# Patient Record
Sex: MALE | Race: WHITE | NOT HISPANIC OR LATINO | Employment: OTHER | URBAN - METROPOLITAN AREA
[De-identification: names, ages, dates, MRNs, and addresses within clinical notes are randomized per-mention and may not be internally consistent; named-entity substitution may affect disease eponyms.]

---

## 2023-02-03 ENCOUNTER — OFFICE VISIT (OUTPATIENT)
Dept: ORTHOPEDICS | Facility: CLINIC | Age: 73
End: 2023-02-03
Payer: MEDICARE

## 2023-02-03 ENCOUNTER — HOSPITAL ENCOUNTER (OUTPATIENT)
Dept: RADIOLOGY | Facility: HOSPITAL | Age: 73
Discharge: HOME | End: 2023-02-03
Attending: PHYSICIAN ASSISTANT
Payer: MEDICARE

## 2023-02-03 DIAGNOSIS — M18.12 PRIMARY OSTEOARTHRITIS OF FIRST CARPOMETACARPAL JOINT OF LEFT HAND: ICD-10-CM

## 2023-02-03 DIAGNOSIS — M79.642 BILATERAL HAND PAIN: ICD-10-CM

## 2023-02-03 DIAGNOSIS — M79.642 BILATERAL HAND PAIN: Primary | ICD-10-CM

## 2023-02-03 DIAGNOSIS — M18.11 PRIMARY OSTEOARTHRITIS OF FIRST CARPOMETACARPAL JOINT OF RIGHT HAND: ICD-10-CM

## 2023-02-03 DIAGNOSIS — M79.641 BILATERAL HAND PAIN: ICD-10-CM

## 2023-02-03 DIAGNOSIS — M79.641 BILATERAL HAND PAIN: Primary | ICD-10-CM

## 2023-02-03 DIAGNOSIS — M19.042 PRIMARY OSTEOARTHRITIS OF BOTH HANDS: Primary | ICD-10-CM

## 2023-02-03 DIAGNOSIS — M19.041 PRIMARY OSTEOARTHRITIS OF BOTH HANDS: Primary | ICD-10-CM

## 2023-02-03 PROCEDURE — 73130 XR HAND COMPLETE 3 VIEW LEFT: ICD-10-PCS | Mod: 26,LT,, | Performed by: RADIOLOGY

## 2023-02-03 PROCEDURE — 99204 PR OFFICE/OUTPT VISIT, NEW, LEVL IV, 45-59 MIN: ICD-10-PCS | Mod: S$PBB,,, | Performed by: PHYSICIAN ASSISTANT

## 2023-02-03 PROCEDURE — 73130 X-RAY EXAM OF HAND: CPT | Mod: TC,PO,LT

## 2023-02-03 PROCEDURE — 73130 X-RAY EXAM OF HAND: CPT | Mod: 26,LT,, | Performed by: RADIOLOGY

## 2023-02-03 PROCEDURE — 99204 OFFICE O/P NEW MOD 45 MIN: CPT | Mod: PBBFAC,PN | Performed by: PHYSICIAN ASSISTANT

## 2023-02-03 PROCEDURE — 73130 X-RAY EXAM OF HAND: CPT | Mod: 26,RT,, | Performed by: RADIOLOGY

## 2023-02-03 PROCEDURE — 73130 X-RAY EXAM OF HAND: CPT | Mod: TC,PO,RT

## 2023-02-03 PROCEDURE — 99999 PR PBB SHADOW E&M-NEW PATIENT-LVL IV: ICD-10-PCS | Mod: PBBFAC,,, | Performed by: PHYSICIAN ASSISTANT

## 2023-02-03 PROCEDURE — 99204 OFFICE O/P NEW MOD 45 MIN: CPT | Mod: S$PBB,,, | Performed by: PHYSICIAN ASSISTANT

## 2023-02-03 PROCEDURE — 99999 PR PBB SHADOW E&M-NEW PATIENT-LVL IV: CPT | Mod: PBBFAC,,, | Performed by: PHYSICIAN ASSISTANT

## 2023-02-03 NOTE — PROGRESS NOTES
2/3/2023    Chief Complaint:  Chief Complaint   Patient presents with    Left Hand - Pain    Right Hand - Pain    Arthritis       HPI:  Conrado Queen is a 72 y.o. male, who presents to clinic today for evaluation of his bilateral hand pain.  States he has had mild bilateral hand pain for years.  States recently over these past 3 weeks has gotten worse.  Denies any acute injury that he can recall.  Denies any paresthesias.  States pain on average is 5/10.  States pain is worse with activity.  States pain is better with rest.  States he is unable to take NSAIDs due to his kidneys.  Denies any other complaints at this time.    PMHX:  Past Medical History:   Diagnosis Date    Cardiomyopathy     Chronic systolic heart failure 2014    Coronary artery disease     Costochondritis 2015    Diabetes mellitus     Diabetes mellitus with renal complications 2014    Diabetes mellitus, type 2     History of internal cardiac defibrillator 2014    History of stroke 2014    HTN (hypertension) 2014    Hypertension     S/P CABG (coronary artery bypass graft) 2014    Seizure disorder 2014    Stroke     Syncope and collapse        PSHX:  Past Surgical History:   Procedure Laterality Date    CARDIAC CATHETERIZATION      CORONARY ANGIOPLASTY      CORONARY ARTERY BYPASS GRAFT  2014    INSERT / REPLACE / REMOVE PACEMAKER      ICD Medtronic    SHOULDER SURGERY Left     TOTAL KNEE ARTHROPLASTY Bilateral     TRANSURETHRAL RESECTION OF PROSTATE         FMHX:  Family History   Problem Relation Age of Onset    Stroke Mother     Heart disease Father     Stroke Father     Diabetes type II Brother        SOCHX:  Social History     Tobacco Use    Smoking status: Former     Packs/day: 0.50     Years: 25.00     Pack years: 12.50     Types: Cigarettes     Quit date: 1988     Years since quittin.5    Smokeless tobacco: Not on file   Substance Use Topics    Alcohol use: Yes     Comment:  occasional       ALLERGIES:  Dilantin  [phenytoin sodium extended]    CURRENT MEDICATIONS:  Current Outpatient Medications on File Prior to Visit   Medication Sig Dispense Refill    aspirin (ECOTRIN) 81 MG EC tablet Take 81 mg by mouth.      carbamazepine (TEGRETOL XR) 100 MG 12 hr tablet Take 100 mg by mouth.      carvedilol (COREG) 3.125 MG tablet Take 3.125 mg by mouth.      clobetasol (TEMOVATE) 0.05 % cream Apply topically.      diclofenac (VOLTAREN) 75 MG EC tablet Take 1 tablet (75 mg total) by mouth 2 (two) times daily. 60 tablet 0    ezetimibe (ZETIA) 10 mg tablet       fluticasone (FLONASE) 50 mcg/actuation nasal spray       furosemide (LASIX) 40 MG tablet Take 40 mg by mouth.      gabapentin (NEURONTIN) 300 MG capsule Take 600 mg by mouth.      insulin aspart U-100 (NOVOLOG) 100 unit/mL (3 mL) InPn pen 15 Units.      insulin glargine (LANTUS) 100 unit/mL injection Inject 30 Units into the skin.      liraglutide 0.6 mg/0.1 mL, 18 mg/3 mL, subq PNIJ (VICTOZA 2-MARSHALL) 0.6 mg/0.1 mL (18 mg/3 mL) PnIj pen Inject 1.2 mg into the skin.      meclizine (ANTIVERT) 25 mg tablet       meloxicam (MOBIC) 7.5 MG tablet       omega-3 acid ethyl esters (LOVAZA) 1 gram capsule Take 2 g by mouth.      omeprazole (PRILOSEC) 20 MG capsule Take 20 mg by mouth.      oxycodone-acetaminophen (PERCOCET)  mg per tablet       ramipril (ALTACE) 10 MG capsule Take 10 mg by mouth.      rosuvastatin (CRESTOR) 40 MG Tab Take 40 mg by mouth.      selenium sulfide (SELSUN) 2.5 % Susp       silodosin (RAPAFLO) 8 mg Cap capsule Take 1 capsule by mouth.      spironolactone (ALDACTONE) 25 MG tablet       tadalafil (CIALIS) 5 MG tablet Take 5 mg by mouth.      testosterone cypionate (DEPOTESTOTERONE CYPIONATE) 200 mg/mL injection       travoprost (TRAVATAN Z) 0.004 % ophthalmic solution 1 drop.      zolpidem (AMBIEN) 10 mg Tab Take 10 mg by mouth.       No current facility-administered medications on file prior to visit.       REVIEW OF  SYSTEMS:  Review of Systems   HENT: Negative.     Eyes: Negative.    Respiratory: Negative.     Cardiovascular: Negative.    Gastrointestinal: Negative.    Genitourinary: Negative.    Musculoskeletal:  Positive for joint pain.   Skin: Negative.    Neurological:  Positive for weakness.   Endo/Heme/Allergies: Negative.    Psychiatric/Behavioral: Negative.       GENERAL PHYSICAL EXAM:   There were no vitals taken for this visit.   GEN: well developed, well nourished, no acute distress   HENT: Normocephalic, atraumatic   EYES: No discharge, conjunctiva normal   NECK: Supple, non-tender   PULM: No wheezing, no respiratory distress   CV: RRR   ABD: Soft, non-tender    ORTHO EXAM:   Examination of the bilateral hands reveals mild arthritic deformities throughout the IP joints.  Presence of arthritic deformities of the bilateral 1st CMC joints.  Able to flex extend the fingers appropriately.  Able to flex extend the wrist appropriately.  5/5 /intrinsic strength.  Tenderness palpation of the bilateral 1st CMC joints.  No significant tenderness noted with palpation throughout the remainder of the bilateral hands.  Sensation is grossly intact in the radial, ulnar, median nerve distributions.  Capillary refill less than 2 seconds in all fingers.    RADIOLOGY:   X-rays of the left hand were taken today in clinic.  X-rays reviewed by myself.  Imaging showed no acute fracture or dislocation.  Presence of severe degenerative changes of the 1st CMC joint of the left hand.  Presence of degenerative changes throughout the IP joints of the left hand, which are more pronounced at the DIP joints.  Presence of a sub millimeter metallic foreign body noted at the dorsum of the right wrist. No osseous destructive/erosive processes noted.  No other significant bony abnormalities noted.     X-rays of the right hand were taken today in clinic.  X-rays reviewed by myself.  Imaging showed no acute fracture or dislocation.  Presence of  moderate to severe degenerative changes of the 1st CMC joint of the right hand.  Presence of degenerative changes throughout the IP joints of the right hand.  Presence of 2 millimeter metallic foreign body over the volar aspect of the distal phalanx of the right middle finger.  No osseous destructive/erosive processes noted.  No other significant bony abnormalities noted.    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 I discussed with Conrado Queen the hand osteoarthritis and 1st CMC joint osteoarthritis pathology and treatment options in detail during today's visit.  After treatment options were discussed, we decided the best course of action this time is to proceed with splinting of the bilateral 1st CMC joints via meta  splints.  We discussed would prefer him to occupational therapy to fitted for bilateral meta  splints and instructed on activity modification/avoidance.  We did discuss he is not a candidate for oral prescription NSAIDs due to his mild kidney dysfunction.  He verbally agreed with the treatment plan.    2.  He was referred occupational therapy in clinic today to be fitted for bilateral meta  splints.      3. I would like him follow-up in clinic in 6 weeks for repeat evaluation.  He was instructed to contact clinic for any problems or concerns in the interim.

## 2023-08-11 ENCOUNTER — TELEPHONE (OUTPATIENT)
Dept: PRIMARY CARE CLINIC | Facility: CLINIC | Age: 73
End: 2023-08-11

## 2023-08-11 ENCOUNTER — TELEPHONE (OUTPATIENT)
Dept: PRIMARY CARE CLINIC | Facility: CLINIC | Age: 73
End: 2023-08-11
Payer: MEDICARE

## 2023-08-11 NOTE — TELEPHONE ENCOUNTER
----- Message from Eduar Carlos sent at 8/11/2023  1:27 PM CDT -----  Contact: [phone]  Caller is requesting an earlier appointment then we can schedule.  Caller is requesting a message be sent to the provider.  If this is for urgent care symptoms, did you offer other providers at this location, providers at other locations, or [facility]? (yes, no, n/a):  N/A  If this is for the patients physical, did you offer to schedule next available and put on wait list, or to see NP or PA for their physical?  (yes, no, n/a):  YES  When is the next available appointment with their provider:  10/26/23  Reason for the appointment:  New Patient Annual  Patient preference of timeframe to be scheduled:  ASAP  Would the patient like a call back, or a response through their [facility] portal?:   call  Comments:   Patient is having stomach issues: when he eats he gets sever stomach pain.

## 2023-08-11 NOTE — TELEPHONE ENCOUNTER
I attempted to reach this patient there was no answer and his voicemail is not set up yet. If the patient is having an urgent matter he needs to either seek urgent or emergent  care or return to his prior PCP.

## 2023-10-26 ENCOUNTER — TELEPHONE (OUTPATIENT)
Dept: PRIMARY CARE CLINIC | Facility: CLINIC | Age: 73
End: 2023-10-26
Payer: MEDICARE

## 2023-10-26 ENCOUNTER — OFFICE VISIT (OUTPATIENT)
Dept: PRIMARY CARE CLINIC | Facility: CLINIC | Age: 73
End: 2023-10-26
Payer: MEDICARE

## 2023-10-26 VITALS
SYSTOLIC BLOOD PRESSURE: 134 MMHG | BODY MASS INDEX: 35.94 KG/M2 | RESPIRATION RATE: 20 BRPM | DIASTOLIC BLOOD PRESSURE: 72 MMHG | HEIGHT: 67 IN | WEIGHT: 229 LBS | OXYGEN SATURATION: 97 % | HEART RATE: 85 BPM

## 2023-10-26 DIAGNOSIS — R11.0 POSTPRANDIAL NAUSEA: ICD-10-CM

## 2023-10-26 DIAGNOSIS — I50.22 CHRONIC SYSTOLIC HEART FAILURE: ICD-10-CM

## 2023-10-26 DIAGNOSIS — I25.5 ISCHEMIC CARDIOMYOPATHY: ICD-10-CM

## 2023-10-26 DIAGNOSIS — I25.118 CORONARY ARTERY DISEASE INVOLVING NATIVE HEART WITH OTHER FORM OF ANGINA PECTORIS, UNSPECIFIED VESSEL OR LESION TYPE: ICD-10-CM

## 2023-10-26 DIAGNOSIS — G40.909 SEIZURE DISORDER: ICD-10-CM

## 2023-10-26 DIAGNOSIS — Z95.1 S/P CABG (CORONARY ARTERY BYPASS GRAFT): ICD-10-CM

## 2023-10-26 DIAGNOSIS — I10 PRIMARY HYPERTENSION: ICD-10-CM

## 2023-10-26 DIAGNOSIS — E11.29 TYPE 2 DIABETES MELLITUS WITH OTHER DIABETIC KIDNEY COMPLICATION, WITHOUT LONG-TERM CURRENT USE OF INSULIN: ICD-10-CM

## 2023-10-26 DIAGNOSIS — F33.9 DEPRESSION, RECURRENT: ICD-10-CM

## 2023-10-26 DIAGNOSIS — Z86.73 HISTORY OF STROKE: ICD-10-CM

## 2023-10-26 DIAGNOSIS — Z76.89 ENCOUNTER TO ESTABLISH CARE: Primary | ICD-10-CM

## 2023-10-26 DIAGNOSIS — I25.10 CORONARY ARTERY DISEASE INVOLVING NATIVE CORONARY ARTERY OF NATIVE HEART WITHOUT ANGINA PECTORIS: ICD-10-CM

## 2023-10-26 PROCEDURE — 99204 OFFICE O/P NEW MOD 45 MIN: CPT | Mod: S$PBB,,, | Performed by: INTERNAL MEDICINE

## 2023-10-26 PROCEDURE — 99999 PR PBB SHADOW E&M-EST. PATIENT-LVL III: CPT | Mod: PBBFAC,,, | Performed by: INTERNAL MEDICINE

## 2023-10-26 PROCEDURE — 99213 OFFICE O/P EST LOW 20 MIN: CPT | Mod: PBBFAC,PN | Performed by: INTERNAL MEDICINE

## 2023-10-26 PROCEDURE — 99204 PR OFFICE/OUTPT VISIT, NEW, LEVL IV, 45-59 MIN: ICD-10-PCS | Mod: S$PBB,,, | Performed by: INTERNAL MEDICINE

## 2023-10-26 PROCEDURE — 99999 PR PBB SHADOW E&M-EST. PATIENT-LVL III: ICD-10-PCS | Mod: PBBFAC,,, | Performed by: INTERNAL MEDICINE

## 2023-10-26 RX ORDER — SUCRALFATE 1 G/1
1 TABLET ORAL 4 TIMES DAILY
Qty: 30 TABLET | Refills: 0 | Status: SHIPPED | OUTPATIENT
Start: 2023-10-26

## 2023-10-26 RX ORDER — DULOXETIN HYDROCHLORIDE 30 MG/1
30 CAPSULE, DELAYED RELEASE ORAL
COMMUNITY
Start: 2023-09-23

## 2023-10-26 RX ORDER — FLUOCINONIDE 0.5 MG/G
CREAM TOPICAL 2 TIMES DAILY
COMMUNITY

## 2023-10-26 RX ORDER — ALFUZOSIN HYDROCHLORIDE 10 MG/1
TABLET, EXTENDED RELEASE ORAL
COMMUNITY

## 2023-10-26 RX ORDER — METFORMIN HYDROCHLORIDE 500 MG/1
500 TABLET ORAL EVERY MORNING
COMMUNITY
Start: 2023-09-27

## 2023-10-26 RX ORDER — NITROGLYCERIN 20 MG/1
PATCH TRANSDERMAL
COMMUNITY

## 2023-10-26 RX ORDER — ALBUTEROL SULFATE 90 UG/1
AEROSOL, METERED RESPIRATORY (INHALATION)
COMMUNITY

## 2023-10-26 RX ORDER — APIXABAN 5 MG/1
5 TABLET, FILM COATED ORAL 2 TIMES DAILY
COMMUNITY
Start: 2023-10-23

## 2023-10-26 RX ORDER — CLOPIDOGREL BISULFATE 75 MG/1
75 TABLET ORAL
COMMUNITY
Start: 2023-09-27

## 2023-10-26 NOTE — TELEPHONE ENCOUNTER
Contacted pt to advise Dr Reyes doesn't prescribe testosterone, ambien, or pain meds. Pt significant other advise that's fine they still want to be seen pt has had lots of GI problems they want to get to controlled and has not had a great pcp to help manage his overall healths. They stated that they have specialists to prescribe him with things Dr Reyes dont prescribe

## 2023-10-26 NOTE — PROGRESS NOTES
[facility]      CHIEF COMPLAINT     Chief Complaint   Patient presents with    Establish Care     Gi issues  Medication review-- to see if there the reason for gi problems        HPI     Conrado Queen is a 73 y.o.  male who presents with a PMHx of  DM2, CDM with CHF, CAD, HTN, HLD, CVA, seizure disorder who presents today to establish care     His main complaints and concerns are:   "Stomach issues" : postprandial nausea.   Has seen his PCP all the time and has seen gastro had EGD and colonscopy but no one can find any answers for him.  has had extensive testing including colonsoopy, EGD, gastric emptyingstudy, CT abdomen and no results.   Affirms that he does have constipation. Does move his bowel for three days then has diarrhea, then constipated again. This could be the reason for his nausea with meals      2. Chronic coccyceal pain: fell a year ago and broke his coccyx. Now with chronic pain. He sees pain management but still has pain    DM2: based on last hgb a1c seems very poorly controlled. Hgb a1c was 11.8. current medicatiosn include     CAD/CHF: from ischemic CDM. Has 5 stents andbypass. EF Is 38% ON RECENT CARDIAC testing. Reviewed all his medication. Does no exercise.   On statin therapy cresto 40mg         CHRONIC HEALTH ISSUES:   Past Medical History:  Past Medical History:   Diagnosis Date    Cardiomyopathy     Chronic pain     Chronic systolic heart failure 07/29/2014    Coronary artery disease     Costochondritis 01/13/2015    Depression     Diabetes mellitus     Diabetes mellitus with renal complications 07/29/2014    Diabetes mellitus, type 2     Disorder of joints of multiple sites     si joints rupture    History of internal cardiac defibrillator 07/29/2014    History of stroke 07/29/2014    HTN (hypertension) 07/29/2014    Hypertension     S/P CABG (coronary artery bypass graft) 08/26/2014    Seizure disorder 07/29/2014    Stroke     Syncope and collapse "          Geriatric Assessment    ADLs : independent  iADLs: independent    Polypharmacy:no    Visual impairments: no change    Hearing impairment: no change    Urinary incontinence: no    Malnutrition: no    Gait/balance/falls: one fall a year ago    Depression: PHQ2. No depressoin    Sleep:     Cognitive Problems: minicog.5/5.     Environmental/social problems:     Functional status:     Support system:     Advanced care planning:  does not have on file. But discussed at length today and she will fill out the paperwork and return it to clinic to be scanned in.    Date: 10/26/2023    Power of   I initiated the process of voluntary advance care planning today and explained the importance of this process to the patient.  I introduced the concept of advance directives to the patient, as well. Then the patient received detailed information about the importance of designating a Health Care Power of  (HCPOA). He was also instructed to communicate with this person about their wishes for future healthcare, should he become sick and lose decision-making capacity. The patient has not previously appointed a HCPOA. After our discussion, the patient has not decided to complete a HCPOA and has appointed his significant other, health care agent:  on file  & health care agent number:  on file  I spent a total time of 10 minutes discussing this issue with the patient.                Worry score 3    Past Surgical History:  Past Surgical History:   Procedure Laterality Date    CARDIAC CATHETERIZATION      CIRCUMCISION      CORONARY ANGIOPLASTY      CORONARY ARTERY BYPASS GRAFT  08/06/2014    INSERT / REPLACE / REMOVE PACEMAKER      ICD Medtronic    SHOULDER SURGERY Left     tail bone fracture      TOTAL KNEE ARTHROPLASTY Bilateral     TRANSURETHRAL RESECTION OF PROSTATE         Allergies:  Review of patient's allergies indicates:   Allergen Reactions    Phenytoin sodium extended Swelling     Facial swelling.  Facial  swelling.      Dapagliflozin      Other reaction(s): Other (See Comments)  Stomach upset       Home Medications:  Prior to Admission medications    Medication Sig Start Date End Date Taking? Authorizing Provider   aspirin (ECOTRIN) 81 MG EC tablet Take 81 mg by mouth.    Provider, Historical   carbamazepine (TEGRETOL XR) 100 MG 12 hr tablet Take 100 mg by mouth. 2/24/14   Provider, Historical   carvedilol (COREG) 3.125 MG tablet Take 3.125 mg by mouth. 7/21/14   Provider, Historical   clobetasol (TEMOVATE) 0.05 % cream Apply topically. 5/29/13   Provider, Historical   diclofenac (VOLTAREN) 75 MG EC tablet Take 1 tablet (75 mg total) by mouth 2 (two) times daily. 1/13/15   Willian Guaman MD   ezetimibe (ZETIA) 10 mg tablet  6/27/14   Provider, Historical   fluticasone (FLONASE) 50 mcg/actuation nasal spray  6/30/14   Provider, Historical   furosemide (LASIX) 40 MG tablet Take 40 mg by mouth. 5/13/14   Provider, Historical   gabapentin (NEURONTIN) 300 MG capsule Take 600 mg by mouth. 11/20/13   Provider, Historical   insulin aspart U-100 (NOVOLOG) 100 unit/mL (3 mL) InPn pen 15 Units. 4/26/14   Provider, Historical   insulin glargine (LANTUS) 100 unit/mL injection Inject 30 Units into the skin.    Provider, Historical   liraglutide 0.6 mg/0.1 mL, 18 mg/3 mL, subq PNIJ (VICTOZA 2-MARSHALL) 0.6 mg/0.1 mL (18 mg/3 mL) PnIj pen Inject 1.2 mg into the skin.    Provider, Historical   meclizine (ANTIVERT) 25 mg tablet  4/22/14   Provider, Historical   meloxicam (MOBIC) 7.5 MG tablet  6/27/14   Provider, Historical   omega-3 acid ethyl esters (LOVAZA) 1 gram capsule Take 2 g by mouth.    Provider, Historical   omeprazole (PRILOSEC) 20 MG capsule Take 20 mg by mouth. 12/9/13   Provider, Historical   oxycodone-acetaminophen (PERCOCET)  mg per tablet  8/12/14   Provider, Historical   ramipril (ALTACE) 10 MG capsule Take 10 mg by mouth. 7/21/14   Provider, Historical   rosuvastatin (CRESTOR) 40 MG Tab Take 40 mg by mouth.  "3/25/14   Provider, Historical   selenium sulfide (SELSUN) 2.5 % Susp  14   Provider, Historical   silodosin (RAPAFLO) 8 mg Cap capsule Take 1 capsule by mouth. 3/14/14   Provider, Historical   spironolactone (ALDACTONE) 25 MG tablet  3/14/14   Provider, Historical   tadalafil (CIALIS) 5 MG tablet Take 5 mg by mouth. 14   Provider, Historical   testosterone cypionate (DEPOTESTOTERONE CYPIONATE) 200 mg/mL injection  14   Provider, Historical   travoprost (TRAVATAN Z) 0.004 % ophthalmic solution 1 drop. 13   Provider, Historical   zolpidem (AMBIEN) 10 mg Tab Take 10 mg by mouth. 14   Provider, Historical       Family History:  Family History   Problem Relation Age of Onset    Stroke Mother     Heart disease Father     Stroke Father     Heart disease Sister     No Known Problems Sister     Diabetes type II Brother     Tremor Brother     Diabetes Maternal Grandmother     No Known Problems Maternal Grandfather     No Known Problems Paternal Grandmother     No Known Problems Paternal Grandfather        Social History:  Social History     Tobacco Use    Smoking status: Former     Current packs/day: 0.00     Average packs/day: 0.5 packs/day for 25.0 years (12.5 ttl pk-yrs)     Types: Cigarettes     Start date: 1963     Quit date: 1988     Years since quittin.2   Substance Use Topics    Alcohol use: Yes     Comment: occasionally    Drug use: Never       Review of Systems:  ROS    Health Maintainence:   Declines all vaccines    PHYSICAL EXAM     /72 (BP Location: Left arm, Patient Position: Sitting)   Pulse 85   Resp 20   Ht 5' 6.5" (1.689 m)   Wt 103.9 kg (228 lb 15.7 oz)   SpO2 97%   BMI 36.40 kg/m²     GEN - A+OX4, NAD   HEENT - PERRL, EOMI, OP clear. MMM.   Neck - No thyromegaly or cervical LAD. No thyroid masses felt.  CV - RRR, no m/r   Chest - CTAB, no wheezing or rhonchi  Abd - S/NT/ND/+BS.   Ext - 2+BDP and radial pulses. No LE edema.   Neuro - PERRL, EOMI, no " nystagmus, eyebrow raise, facial sensation, hearing, m of mastication, smile, palatal raise, shoulder shrug, tongue protrusion symmetric and intact. 5/5 BUE and BLE strength. Sensation to light touch intact throughout. 2+ DTRs. Normal gait.   MSK - No spinal tenderness to palpation. Normal gait.   Skin - No rash.  Protective Sensation (w/ 10 gram monofilament):  Right: Decreased  Left: Decreased    Visual Inspection:  Callus -  Bilateral    Pedal Pulses:   Right: Present  Left: Present    Posterior Tibialis Pulses:   Right:Present  Left: Present        LABS     Previous labs reviewed.      ASSESSMENT/PLAN     Conrado Queen is a 73 y.o. male with  1. Encounter to establish care  -medications reviewed and consolidated  -reviewed PMH, medications, HCM including vaccinations.   -reviewed most recent lab work. Reordered as needed. Discussed abnormalities with patient with time allowed for questions.   -reviewed clinic policy with patient including to arrive 15 mins before appointments, labs and results including expected turn around for results.   -outside records and consultants notes reviewed as time allowed. Updated treatment team with name of other providers.   -reviewed and confirmed patients contact information, preferred pharmacy etc.   -AVS provided after visit to summarized things discussed.   -The following assessments were completed:  Living Situation  CAGE  Depression Screening  Timed Get Up and Go  Cognitive Function Screening-Minicog   Nutrition Screening  ADL Screening      2. Seizure disorder  Overview:  On carbemazepine and doing well.   Does not follow with neurology  Continue current medicatios      3. History of stroke  Overview:  On statin and plavix  Encouraged he needs to exercise and limit his risk factors.       4. S/P CABG (coronary artery bypass graft)  Overview:  With 38% EF  Continue current medicatios  Needs exccercise  encoruaged proper diet       5. Coronary artery disease involving  native coronary artery of native heart without angina pectoris  Overview:  On aldactone, ace-I BB statin  Does no excecrise which christine warned him against.   Encouraged proper diet   Continue per cardiology      6. Ischemic cardiomyopathy  Overview:  With 38% EF  Continue current medicatios  Needs exccercise  encoruaged proper diet       7. Chronic systolic heart failure  Overview:  Aldactone  Advised low salt diet, says he tries  EF 38% per last ECHO.       8. Primary hypertension  Overview:  BP is very well controlled   Advised a low salt diet   Encouraged weight loss      9. Type 2 diabetes mellitus with other diabetic kidney complication, without long-term current use of insulin  Overview:  Pending labs. Has had labs done by PCP a month ago so we have to get a copy.       10. Depression, recurrent  Overview:  Mild and stable  Denies SI/HI  Continue current management      11. Coronary artery disease involving native heart with other form of angina pectoris, unspecified vessel or lesion type  Overview:  On statin and plavix.   Encouraged excercise      12. Postprandial nausea  Overview:  Has had a thorough work up  Will try to tritrate off his medicatios to see if side effect. First one off is ramipril as this can cause colonic edema      Other orders  -     sucralfate (CARAFATE) 1 gram tablet; Take 1 tablet (1 g total) by mouth 4 (four) times daily.  Dispense: 30 tablet; Refill: 0         ACP: discussion had about ACP to include definition of ACP, HCP, CODE STATUS. Paperwork handed to patient, to review, discuss with family and return it to clinic to be scanned into the chart.   Advance Care Planning     My total time spent on this encounter was at least 60 minutes which included  the following activities: preparing to see the patient, performing a medically appropriate and/or evaluation, counseling and educating the patient and family/caregiver, ordering medications, tests, or procedures, referring and  communicating with other healthcare providers, documenting clinical information in the electronic or other health record. This time is independent and non-overlapping.         RTC in 1 months, sooner if needed and depending on labs.    Maritza Reyes MD  Board Certified Internist/Geriatrician  [facility] [place] [address] ([place])

## 2023-10-30 ENCOUNTER — TELEPHONE (OUTPATIENT)
Dept: PRIMARY CARE CLINIC | Facility: CLINIC | Age: 73
End: 2023-10-30
Payer: MEDICARE

## 2023-10-30 NOTE — TELEPHONE ENCOUNTER
Pt returned call said he will be going to [place] today for a visit and will take his documents with him and ask them to scan them in or send it to us when he goes there he will call us today so we know to check records for documents    Pt also called to ask what two otc meds you advised him to start taking I pulled avs summary and was able to see that sucrafate was prescribed and that you advised to start fiber daily and stop 2 other meds -- siginificant other wrote it down   Call disconnected

## 2023-10-30 NOTE — TELEPHONE ENCOUNTER
Contacted pt to advise Dr Reyes will like eye exam and colonscopy results before 2 week f/u so that she can review them. I was going to see if the spouse would help pt ot set up portal so the images of the documents can be loaded into the portal to prevent them bringing it all the way to our location. No answer was received I did leave a vm so hopefully call will be returned.

## 2023-11-13 ENCOUNTER — OFFICE VISIT (OUTPATIENT)
Dept: PRIMARY CARE CLINIC | Facility: CLINIC | Age: 73
End: 2023-11-13
Payer: MEDICARE

## 2023-11-13 VITALS
WEIGHT: 233.81 LBS | OXYGEN SATURATION: 95 % | DIASTOLIC BLOOD PRESSURE: 62 MMHG | HEIGHT: 67 IN | SYSTOLIC BLOOD PRESSURE: 114 MMHG | TEMPERATURE: 99 F | HEART RATE: 79 BPM | BODY MASS INDEX: 36.7 KG/M2 | RESPIRATION RATE: 18 BRPM

## 2023-11-13 DIAGNOSIS — R11.0 POSTPRANDIAL NAUSEA: Primary | ICD-10-CM

## 2023-11-13 PROCEDURE — 99213 PR OFFICE/OUTPT VISIT, EST, LEVL III, 20-29 MIN: ICD-10-PCS | Mod: S$PBB,,, | Performed by: INTERNAL MEDICINE

## 2023-11-13 PROCEDURE — 99213 OFFICE O/P EST LOW 20 MIN: CPT | Mod: PBBFAC,PN | Performed by: INTERNAL MEDICINE

## 2023-11-13 PROCEDURE — 99999 PR PBB SHADOW E&M-EST. PATIENT-LVL III: CPT | Mod: PBBFAC,,, | Performed by: INTERNAL MEDICINE

## 2023-11-13 PROCEDURE — 99999 PR PBB SHADOW E&M-EST. PATIENT-LVL III: ICD-10-PCS | Mod: PBBFAC,,, | Performed by: INTERNAL MEDICINE

## 2023-11-13 PROCEDURE — 99213 OFFICE O/P EST LOW 20 MIN: CPT | Mod: S$PBB,,, | Performed by: INTERNAL MEDICINE

## 2023-11-13 NOTE — PATIENT INSTRUCTIONS
Switch the metformin to night time.       Carbamazepine can cause nausea but do not stop this unless instructed by neurologist

## 2023-11-13 NOTE — PROGRESS NOTES
"INTERNAL MEDICINE PROGRESS/URGENT CARE NOTE    CHIEF COMPLAINT     Chief Complaint   Patient presents with    Follow-up     2 week f/u        HPI   Conrado Queen is a 73 y.o.  male who presents with a PMHx of  DM2, CDM with CHF, CAD, HTN, HLD, CVA, seizure disorder who presents today for 2-week follow up.     At his initial visit two weeks ago, he complained of  chronic ongoing "abdominal issues": detailed as severe post pradnial nausea and abdominal pain. As he was new and we had no records, we requested his records and asked him to return in two weeks.  was started on sucralfate at the time as well   His main complaints and concerns are:   "Stomach issues" : postprandial nausea.   Has seen his PCP all the time and has seen gastro had EGD and colonscopy but no one can find any answers for him.  has had extensive testing including colonsoopy, EGD, gastric emptyingstudy, CT abdomen and no results.   Affirms that he does have constipation. Does move his bowel for three days then has diarrhea, then constipated again. This could be the reason for his nausea with meals    I have reviewed his records and his gastro doctor details that this nausea is "vague, chronic and ongoing" he has indeed had multitudes of testing done. Including CTA abdomen which showed a high grade SMA stenosis. Colonoscopy with a few benign polyps.     Today, he reports he is having great bowel movements by adding the metamucil I told him to.   Still with the nausea.   I agree with gastro that its likely multifactorial including long standing DM, medications etc     Home Medications:  Prior to Admission medications    Medication Sig Start Date End Date Taking? Authorizing Provider   albuterol (PROVENTIL/VENTOLIN HFA) 90 mcg/actuation inhaler Ventolin HFA Take No date recorded No form recorded No frequency recorded No route recorded No set duration recorded No set duration amount recorded active No dosage strength recorded No dosage strength " units of measure recorded    Provider, Historical   alfuzosin (UROXATRAL) 10 mg Tb24 alfuzosin Take No date recorded No form recorded No frequency recorded No route recorded No set duration recorded No set duration amount recorded active No dosage strength recorded No dosage strength units of measure recorded    Provider, Historical   aspirin (ECOTRIN) 81 MG EC tablet Take 81 mg by mouth.    Provider, Historical   carbamazepine (TEGRETOL XR) 100 MG 12 hr tablet Take 100 mg by mouth. 2/24/14   Provider, Historical   carvedilol (COREG) 3.125 MG tablet Take 3.125 mg by mouth. 7/21/14   Provider, Historical   clobetasol (TEMOVATE) 0.05 % cream Apply topically. 5/29/13   Provider, Historical   clopidogreL (PLAVIX) 75 mg tablet Take 75 mg by mouth. 9/27/23   Provider, Historical   diclofenac (VOLTAREN) 75 MG EC tablet Take 1 tablet (75 mg total) by mouth 2 (two) times daily. 1/13/15   Willian Guaman MD   DULoxetine (CYMBALTA) 30 MG capsule Take 30 mg by mouth. 9/23/23   Provider, Historical   ELIQUIS 5 mg Tab Take 5 mg by mouth 2 (two) times daily. 10/23/23   Provider, Historical   ezetimibe (ZETIA) 10 mg tablet  6/27/14   Provider, Historical   fluocinonide 0.05% (LIDEX) 0.05 % cream Apply topically 2 (two) times daily. As needed    Provider, Historical   fluticasone (FLONASE) 50 mcg/actuation nasal spray  6/30/14   Provider, Historical   furosemide (LASIX) 40 MG tablet Take 40 mg by mouth. 5/13/14   Provider, Historical   gabapentin (NEURONTIN) 300 MG capsule Take 600 mg by mouth. 11/20/13   Provider, Historical   insulin aspart U-100 (NOVOLOG) 100 unit/mL (3 mL) InPn pen 15 Units. 4/26/14   Provider, Historical   insulin glargine (LANTUS) 100 unit/mL injection Inject 30 Units into the skin.    Provider, Historical   linaCLOtide (LINZESS) 290 mcg Cap capsule Take 290 mcg by mouth. 6/26/23   Provider, Historical   liraglutide 0.6 mg/0.1 mL, 18 mg/3 mL, subq PNIJ (VICTOZA 2-MARSHALL) 0.6 mg/0.1 mL (18 mg/3 mL) PnIj pen  "Inject 1.2 mg into the skin.    Provider, Historical   meclizine (ANTIVERT) 25 mg tablet  4/22/14   Provider, Historical   metFORMIN (GLUCOPHAGE) 500 MG tablet Take 500 mg by mouth every morning. 9/27/23   Provider, Historical   nitroGLYCERIN 0.1 mg/hr TD PT24 (NITRODUR) 0.1 mg/hr PT24 nitroglycerin Take No date recorded No form recorded No frequency recorded No route recorded No set duration recorded No set duration amount recorded active No dosage strength recorded No dosage strength units of measure recorded    Provider, Historical   omega-3 acid ethyl esters (LOVAZA) 1 gram capsule Take 2 g by mouth.    Provider, Historical   omeprazole (PRILOSEC) 20 MG capsule Take 20 mg by mouth. 12/9/13   Provider, Historical   oxycodone-acetaminophen (PERCOCET)  mg per tablet  8/12/14   Provider, Historical   ramipril (ALTACE) 10 MG capsule Take 10 mg by mouth. 7/21/14   Provider, Historical   rosuvastatin (CRESTOR) 40 MG Tab Take 40 mg by mouth. 3/25/14   Provider, Historical   selenium sulfide (SELSUN) 2.5 % Susp  7/21/14   Provider, Historical   silodosin (RAPAFLO) 8 mg Cap capsule Take 1 capsule by mouth. 3/14/14   Provider, Historical   spironolactone (ALDACTONE) 25 MG tablet  3/14/14   Provider, Historical   sucralfate (CARAFATE) 1 gram tablet Take 1 tablet (1 g total) by mouth 4 (four) times daily. 10/26/23   Maritza Reyes MD   tadalafil (CIALIS) 5 MG tablet Take 5 mg by mouth. 6/5/14   Provider, Historical   testosterone cypionate (DEPOTESTOTERONE CYPIONATE) 200 mg/mL injection  7/21/14   Provider, Historical   travoprost (TRAVATAN Z) 0.004 % ophthalmic solution 1 drop. 1/28/13   Provider, Historical   zolpidem (AMBIEN) 10 mg Tab Take 10 mg by mouth. 6/5/14   Provider, Historical       Review of Systems:  Review of Systems        PHYSICAL EXAM     /62 (BP Location: Right arm, Patient Position: Sitting, BP Method: Medium (Manual))   Pulse 79   Temp 98.6 °F (37 °C) (Oral)   Resp 18   Ht 5' 6.5" " (1.689 m)   Wt 106.1 kg (233 lb 12.8 oz)   SpO2 95%   BMI 37.17 kg/m²     GEN - A+OX4, NAD       LABS       ASSESSMENT/PLAN     Conrado Queen is a 73 y.o. male with  1. Postprandial nausea  Constipation has significantly improved.   Overview:  Has had a thorough work up  Will try to tritrate off his medicatios to see if side effect. First one off is ramipril as this can cause colonic edema      RTC per patients decision to change PCP     Maritza Reyes MD  Board Certified Internist/Geriatrician  [facility] ([place])

## 2023-12-08 ENCOUNTER — TELEPHONE (OUTPATIENT)
Dept: PRIMARY CARE CLINIC | Facility: CLINIC | Age: 73
End: 2023-12-08
Payer: MEDICARE

## 2023-12-08 NOTE — TELEPHONE ENCOUNTER
Called Dr. Brannon Hyman's office at [phone] to get recall for pt's colonoscopy performed 3/13/23.

## 2023-12-26 ENCOUNTER — TELEPHONE (OUTPATIENT)
Dept: PRIMARY CARE CLINIC | Facility: CLINIC | Age: 73
End: 2023-12-26
Payer: MEDICARE